# Patient Record
Sex: FEMALE | Race: WHITE | Employment: OTHER | URBAN - METROPOLITAN AREA
[De-identification: names, ages, dates, MRNs, and addresses within clinical notes are randomized per-mention and may not be internally consistent; named-entity substitution may affect disease eponyms.]

---

## 2021-10-27 ENCOUNTER — TRANSCRIBE ORDER (OUTPATIENT)
Dept: SCHEDULING | Age: 76
End: 2021-10-27

## 2021-10-27 DIAGNOSIS — Z96.652 PRESENCE OF LEFT ARTIFICIAL KNEE JOINT: Primary | ICD-10-CM

## 2021-11-02 ENCOUNTER — HOSPITAL ENCOUNTER (OUTPATIENT)
Dept: CT IMAGING | Age: 76
Discharge: HOME | End: 2021-11-02
Attending: ORTHOPAEDIC SURGERY
Payer: MEDICARE

## 2021-11-02 DIAGNOSIS — Z96.652 PRESENCE OF LEFT ARTIFICIAL KNEE JOINT: ICD-10-CM

## 2021-11-02 PROCEDURE — 73700 CT LOWER EXTREMITY W/O DYE: CPT

## 2022-12-14 NOTE — PROGRESS NOTES
Sarah Davalos is a 65 y.o. female presents by PCP for a referral for HRT creme    Chief Complaint   Patient presents with    Referral / Consult     For HRT-sent by PCP     No LMP recorded (lmp unknown). Patient has had a hysterectomy. Birth Control: post menopausal status. Last Pap: pt a poor historian-cannot remember last pap,mammo,dexa or colonoscopyDorothy Amy Huang is a 65 y.o. female presents for a problem visit. Chief Complaint   Patient presents with    Referral / Consult     For HRT-sent by PCP     No LMP recorded (lmp unknown). Patient has had a hysterectomy.

## 2022-12-22 ENCOUNTER — OFFICE VISIT (OUTPATIENT)
Dept: OBGYN CLINIC | Age: 77
End: 2022-12-22
Payer: MEDICARE

## 2022-12-22 ENCOUNTER — TELEPHONE (OUTPATIENT)
Dept: OBGYN CLINIC | Age: 77
End: 2022-12-22

## 2022-12-22 VITALS — DIASTOLIC BLOOD PRESSURE: 85 MMHG | SYSTOLIC BLOOD PRESSURE: 167 MMHG | WEIGHT: 183.4 LBS

## 2022-12-22 DIAGNOSIS — N95.2 VAGINAL ATROPHY: Primary | ICD-10-CM

## 2022-12-22 PROCEDURE — G8536 NO DOC ELDER MAL SCRN: HCPCS | Performed by: OBSTETRICS & GYNECOLOGY

## 2022-12-22 PROCEDURE — 1090F PRES/ABSN URINE INCON ASSESS: CPT | Performed by: OBSTETRICS & GYNECOLOGY

## 2022-12-22 PROCEDURE — G8432 DEP SCR NOT DOC, RNG: HCPCS | Performed by: OBSTETRICS & GYNECOLOGY

## 2022-12-22 PROCEDURE — 1123F ACP DISCUSS/DSCN MKR DOCD: CPT | Performed by: OBSTETRICS & GYNECOLOGY

## 2022-12-22 PROCEDURE — G8400 PT W/DXA NO RESULTS DOC: HCPCS | Performed by: OBSTETRICS & GYNECOLOGY

## 2022-12-22 PROCEDURE — G8421 BMI NOT CALCULATED: HCPCS | Performed by: OBSTETRICS & GYNECOLOGY

## 2022-12-22 PROCEDURE — 1101F PT FALLS ASSESS-DOCD LE1/YR: CPT | Performed by: OBSTETRICS & GYNECOLOGY

## 2022-12-22 PROCEDURE — 99203 OFFICE O/P NEW LOW 30 MIN: CPT | Performed by: OBSTETRICS & GYNECOLOGY

## 2022-12-22 PROCEDURE — G8427 DOCREV CUR MEDS BY ELIG CLIN: HCPCS | Performed by: OBSTETRICS & GYNECOLOGY

## 2022-12-22 RX ORDER — PRIMIDONE 50 MG/1
TABLET ORAL 3 TIMES DAILY
COMMUNITY

## 2022-12-22 RX ORDER — OMEPRAZOLE 40 MG/1
40 CAPSULE, DELAYED RELEASE ORAL DAILY
COMMUNITY

## 2022-12-22 RX ORDER — NICOTINE POLACRILEX 2 MG
GUM BUCCAL
COMMUNITY

## 2022-12-22 RX ORDER — LANOLIN ALCOHOL/MO/W.PET/CERES
325 CREAM (GRAM) TOPICAL
COMMUNITY

## 2022-12-22 RX ORDER — HYDROXYCHLOROQUINE SULFATE 200 MG/1
200 TABLET, FILM COATED ORAL DAILY
COMMUNITY

## 2022-12-22 RX ORDER — FLUOXETINE 10 MG/1
10 CAPSULE ORAL DAILY
COMMUNITY

## 2022-12-22 RX ORDER — ESTRADIOL 1 MG/1
1 TABLET ORAL DAILY
COMMUNITY

## 2022-12-22 RX ORDER — METHENAMINE HIPPURATE 1000 MG/1
1 TABLET ORAL 2 TIMES DAILY WITH MEALS
COMMUNITY

## 2022-12-22 RX ORDER — CHOLECALCIFEROL (VITAMIN D3) 125 MCG
CAPSULE ORAL
COMMUNITY

## 2022-12-22 RX ORDER — ZINC GLUCONATE 50 MG
50 TABLET ORAL DAILY
COMMUNITY

## 2022-12-22 NOTE — PROGRESS NOTES
OB/GYN Problem VIsit    HPI  Amber Jamil is a No obstetric history on file. ,  65 y.o. female who presents for a problem visit. Referred by PCP for evaluation for hormone replacement therapy. The patient has a history of breast cancer status postmastectomy bilateral in the 80's. Using po estradiol and vaginal estrogen.  applies small amount with finger - not even applicator full. No hx of DVT. Was getting this [place]. Has mesh in vagina    History reviewed. No pertinent past medical history. History reviewed. No pertinent surgical history. Social History     Occupational History    Not on file   Tobacco Use    Smoking status: Former     Types: Cigarettes    Smokeless tobacco: Never   Substance and Sexual Activity    Alcohol use: Not Currently    Drug use: Never    Sexual activity: Not on file     History reviewed. No pertinent family history. Allergies   Allergen Reactions    Latex Rash     Prior to Admission medications    Medication Sig Start Date End Date Taking? Authorizing Provider   ascorbic acid, vitamin C, (VITAMIN C) 100 mg tab Take 100 mg by mouth daily. Yes Provider, Historical   biotin 1 mg cap Take  by mouth. Yes Provider, Historical   ferrous sulfate 325 mg (65 mg iron) tablet Take 325 mg by mouth. Yes Provider, Historical   methenamine hippurate (HIPREX) 1 gram tablet Take 1 g by mouth two (2) times daily (with meals). Yes Provider, Historical   zinc gluconate 50 mg tablet Take 50 mg by mouth daily. Yes Provider, Historical   estradioL (ESTRACE) 1 mg tablet Take 1 mg by mouth daily. Yes Provider, Historical   FLUoxetine (PROzac) 10 mg capsule Take 10 mg by mouth daily. Yes Provider, Historical   omeprazole (PRILOSEC) 40 mg capsule Take 40 mg by mouth daily. Yes Provider, Historical   primidone (MYSOLINE) 50 mg tablet Take  by mouth three (3) times daily. Yes Provider, Historical   hydrOXYchloroQUINE (PLAQUENIL) 200 mg tablet Take 200 mg by mouth daily.    Yes Provider, Historical   cholecalciferol, vitamin D3, (Vitamin D3) 50 mcg (2,000 unit) tab Take  by mouth. Yes Provider, Historical   POTASSIUM-99 PO Take  by mouth.    Yes Provider, Historical        Review of Systems: History obtained from the patient  Constitutional: negative for weight loss, fever, night sweats  Breast: negative for breast lumps, nipple discharge, galactorrhea  GI: negative for change in bowel habits, abdominal pain, black or bloody stools  : negative for frequency, dysuria, hematuria, vaginal discharge  MSK: negative for back pain, joint pain, muscle pain  Skin: negative for itching, rash, hives  Psych: negative for anxiety, depression, change in mood      Objective:  Visit Vitals  BP (!) 167/85 (BP 1 Location: Right upper arm)   Wt 183 lb 6.4 oz (83.2 kg)   LMP  (LMP Unknown)       Physical Exam:   PHYSICAL EXAMINATION    Constitutional  Appearance: well-nourished, well developed, alert, in no acute distress      Gastrointestinal  Abdominal Examination: abdomen non-tender to palpation, normal bowel sounds, no masses present  Liver and spleen: no hepatomegaly present, spleen not palpable  Hernias: no hernias identified    Genitourinary  External Genitalia: normal appearance for age, no discharge present, no tenderness present, no inflammatory lesions present, no masses present, no atrophy present  Vagina: normal vaginal vault without central or paravaginal defects, no discharge present, no inflammatory lesions present, no masses present  Bladder: non-tender to palpation  Urethra: appears normal  Cervix: normal   Uterus: normal size, shape and consistency  Adnexa: no adnexal tenderness present, no adnexal masses present  Perineum: perineum within normal limits, no evidence of trauma, no rashes or skin lesions present  Anus: anus within normal limits, no hemorrhoids present  Inguinal Lymph Nodes: no lymphadenopathy present    Skin  General Inspection: no rash, no lesions identified    Neurologic/Psychiatric  Mental Status:  Orientation: grossly oriented to person, place and time  Mood and Affect: mood normal, affect appropriate      ASSESSMENT:    ICD-10-CM ICD-9-CM    1. Encounter for gynecological examination (general) (routine) without abnormal findings  Z01.419 V72.31           PLAN:  Orders Placed This Encounter    ascorbic acid, vitamin C, (VITAMIN C) 100 mg tab     Sig: Take 100 mg by mouth daily. biotin 1 mg cap     Sig: Take  by mouth. ferrous sulfate 325 mg (65 mg iron) tablet     Sig: Take 325 mg by mouth.    methenamine hippurate (HIPREX) 1 gram tablet     Sig: Take 1 g by mouth two (2) times daily (with meals). zinc gluconate 50 mg tablet     Sig: Take 50 mg by mouth daily. estradioL (ESTRACE) 1 mg tablet     Sig: Take 1 mg by mouth daily. FLUoxetine (PROzac) 10 mg capsule     Sig: Take 10 mg by mouth daily. omeprazole (PRILOSEC) 40 mg capsule     Sig: Take 40 mg by mouth daily. primidone (MYSOLINE) 50 mg tablet     Sig: Take  by mouth three (3) times daily. hydrOXYchloroQUINE (PLAQUENIL) 200 mg tablet     Sig: Take 200 mg by mouth daily. cholecalciferol, vitamin D3, (Vitamin D3) 50 mcg (2,000 unit) tab     Sig: Take  by mouth. POTASSIUM-99 PO     Sig: Take  by mouth. RTO prn if symptoms persist or worsen. Instructions given to pt. Handouts given to pt.

## 2022-12-22 NOTE — TELEPHONE ENCOUNTER
Attempt to call patient with number provide x 3. Immediately goes to voicemail. Pt did not update pharmacy at  upon arrival as new patient.

## 2022-12-28 ENCOUNTER — TELEPHONE (OUTPATIENT)
Dept: OBGYN CLINIC | Age: 77
End: 2022-12-28

## 2022-12-28 RX ORDER — ESTRADIOL 0.1 MG/G
CREAM VAGINAL
Qty: 42.5 G | Refills: 1 | Status: SHIPPED | OUTPATIENT
Start: 2022-12-28 | End: 2022-12-28

## 2022-12-28 RX ORDER — ESTRADIOL 0.1 MG/G
CREAM VAGINAL
Qty: 42.5 G | Refills: 1 | Status: SHIPPED | OUTPATIENT
Start: 2022-12-28

## 2022-12-28 NOTE — TELEPHONE ENCOUNTER
65year old patient seen in the office on 12/22/2022      advised of need for patient to sign HIPPA form    Prescription refill sent as per MD order to patient preferred pharmacy    Patient verbalized understanding.

## 2023-01-10 RX ORDER — ESTRADIOL 0.1 MG/G
CREAM VAGINAL
Qty: 42.5 G | Refills: 1 | Status: SHIPPED | OUTPATIENT
Start: 2023-01-10

## 2024-01-30 ASSESSMENT — ENCOUNTER SYMPTOMS
CONSTIPATION: 0
CHEST TIGHTNESS: 0
SHORTNESS OF BREATH: 0
SINUS PAIN: 0
COUGH: 0
ABDOMINAL PAIN: 0
NAUSEA: 0
BLOOD IN STOOL: 0
DIARRHEA: 0

## 2024-01-31 ENCOUNTER — OFFICE VISIT (OUTPATIENT)
Facility: CLINIC | Age: 79
End: 2024-01-31
Payer: MEDICARE

## 2024-01-31 VITALS
RESPIRATION RATE: 16 BRPM | OXYGEN SATURATION: 98 % | BODY MASS INDEX: 26.77 KG/M2 | DIASTOLIC BLOOD PRESSURE: 68 MMHG | TEMPERATURE: 98.2 F | HEIGHT: 70 IN | WEIGHT: 187 LBS | SYSTOLIC BLOOD PRESSURE: 126 MMHG | HEART RATE: 74 BPM

## 2024-01-31 DIAGNOSIS — M19.90 ARTHRITIS: ICD-10-CM

## 2024-01-31 DIAGNOSIS — K58.0 IRRITABLE BOWEL SYNDROME WITH DIARRHEA: ICD-10-CM

## 2024-01-31 DIAGNOSIS — R21 ACUTE ERUPTION OF SKIN: ICD-10-CM

## 2024-01-31 DIAGNOSIS — M32.9 LUPUS (HCC): Primary | ICD-10-CM

## 2024-01-31 DIAGNOSIS — Z85.3 HISTORY OF BREAST CANCER: ICD-10-CM

## 2024-01-31 DIAGNOSIS — F33.41 RECURRENT MAJOR DEPRESSIVE DISORDER, IN PARTIAL REMISSION (HCC): ICD-10-CM

## 2024-01-31 PROCEDURE — G8427 DOCREV CUR MEDS BY ELIG CLIN: HCPCS | Performed by: FAMILY MEDICINE

## 2024-01-31 PROCEDURE — G8400 PT W/DXA NO RESULTS DOC: HCPCS | Performed by: FAMILY MEDICINE

## 2024-01-31 PROCEDURE — 1090F PRES/ABSN URINE INCON ASSESS: CPT | Performed by: FAMILY MEDICINE

## 2024-01-31 PROCEDURE — G8419 CALC BMI OUT NRM PARAM NOF/U: HCPCS | Performed by: FAMILY MEDICINE

## 2024-01-31 PROCEDURE — 1036F TOBACCO NON-USER: CPT | Performed by: FAMILY MEDICINE

## 2024-01-31 PROCEDURE — 99214 OFFICE O/P EST MOD 30 MIN: CPT | Performed by: FAMILY MEDICINE

## 2024-01-31 PROCEDURE — G8484 FLU IMMUNIZE NO ADMIN: HCPCS | Performed by: FAMILY MEDICINE

## 2024-01-31 PROCEDURE — 1123F ACP DISCUSS/DSCN MKR DOCD: CPT | Performed by: FAMILY MEDICINE

## 2024-01-31 RX ORDER — TRIAMCINOLONE ACETONIDE 1 MG/G
CREAM TOPICAL
Qty: 80 G | Refills: 1 | Status: SHIPPED | OUTPATIENT
Start: 2024-01-31 | End: 2024-01-31

## 2024-01-31 RX ORDER — TRIAMCINOLONE ACETONIDE 1 MG/G
CREAM TOPICAL
Qty: 80 G | Refills: 1 | Status: SHIPPED | OUTPATIENT
Start: 2024-01-31

## 2024-01-31 SDOH — ECONOMIC STABILITY: FOOD INSECURITY: WITHIN THE PAST 12 MONTHS, YOU WORRIED THAT YOUR FOOD WOULD RUN OUT BEFORE YOU GOT MONEY TO BUY MORE.: NEVER TRUE

## 2024-01-31 SDOH — ECONOMIC STABILITY: HOUSING INSECURITY
IN THE LAST 12 MONTHS, WAS THERE A TIME WHEN YOU DID NOT HAVE A STEADY PLACE TO SLEEP OR SLEPT IN A SHELTER (INCLUDING NOW)?: NO

## 2024-01-31 SDOH — ECONOMIC STABILITY: INCOME INSECURITY: HOW HARD IS IT FOR YOU TO PAY FOR THE VERY BASICS LIKE FOOD, HOUSING, MEDICAL CARE, AND HEATING?: NOT HARD AT ALL

## 2024-01-31 SDOH — ECONOMIC STABILITY: FOOD INSECURITY: WITHIN THE PAST 12 MONTHS, THE FOOD YOU BOUGHT JUST DIDN'T LAST AND YOU DIDN'T HAVE MONEY TO GET MORE.: NEVER TRUE

## 2024-01-31 ASSESSMENT — PATIENT HEALTH QUESTIONNAIRE - PHQ9
SUM OF ALL RESPONSES TO PHQ9 QUESTIONS 1 & 2: 0
SUM OF ALL RESPONSES TO PHQ QUESTIONS 1-9: 0
1. LITTLE INTEREST OR PLEASURE IN DOING THINGS: 0
2. FEELING DOWN, DEPRESSED OR HOPELESS: 0
SUM OF ALL RESPONSES TO PHQ QUESTIONS 1-9: 0

## 2024-01-31 ASSESSMENT — ENCOUNTER SYMPTOMS: ROS SKIN COMMENTS: ITCHING

## 2024-01-31 NOTE — PROGRESS NOTES
Assessment/Plan:     Diagnoses and all orders for this visit:      1. Lupus (HCC)  -    Will provide refill of triamcinolone cream. Advised sparing use of this and to try to avoid face. Will request notes from Rheum as they report recent labs performed.   - triamcinolone (KENALOG) 0.1 % cream; Apply topically 2 times daily - use small amount only as needed; use rarely on face, Disp-80 g, R-1, Normal  2. Acute eruption of skin  -   Discussed signs and symptoms of infection including swelling, warmth. Concern given pruritus that patient could develop infection. They agree to reach out should symptoms worsen.  -  triamcinolone (KENALOG) 0.1 % cream; Apply topically 2 times daily - use small amount only as needed; use rarely on face, Disp-80 g, R-1, Normal  3. Recurrent major depressive disorder, in partial remission (HCC)  - Stable with use of prozac, denies SI/HI  4. Irritable bowel syndrome with diarrhea  - Per patient, following with GI, no GI records for review.   5. Arthritis  - Being followed by Dr. Leggett. Patient's  requesting permanent disability parking placard for patient as well as himself. Discussed that I could not complete his form as I have not seen him as a patient. I also do not have Ortho records for Ms. Lilly. Advised that I do not complete permanent disability placards except in the event of conditions such as muscular dystrophy, palsy, wheelchair-limiting conditions. I offered a six month temporary placard, however pt's  stated this would not work for them given their travel. He took back forms for both himself and patient. He plans to discuss with Dr. Cruz during his upcoming new patient appt.   6. History of breast cancer  - No new symptoms or treatment.      Faxed request to Dr. Crowder for recent blood work and note.     Return in about 6 months (around 7/31/2024) for AWV.       Discussed expected course/resolution/complications of diagnosis in detail with patient.

## 2024-01-31 NOTE — PROGRESS NOTES
Patient's identity has been verified with 2 identifiers (Name and )    Chief Complaint   Patient presents with    New Patient     Annie Lilly is an 79 y.o. female who presents as a new patient to [facility] to establish care.         Health Maintenance Due   Topic    COVID-19 Vaccine (1)    Depression Screen     DTaP/Tdap/Td vaccine (1 - Tdap)    Shingles vaccine (1 of 2)    DEXA (modify frequency per FRAX score)     Respiratory Syncytial Virus (RSV) Pregnant or age 60 yrs+ (1 - 1-dose 60+ series)    Pneumococcal 65+ years Vaccine (1 - PCV)    Flu vaccine (1)    Annual Wellness Visit (Medicare)        Wt Readings from Last 3 Encounters:   24 84.8 kg (187 lb)   22 83.2 kg (183 lb 6.4 oz)     Temp Readings from Last 3 Encounters:   No data found for Temp     BP Readings from Last 3 Encounters:   22 (!) 167/85     Pulse Readings from Last 3 Encounters:   No data found for Pulse           Coordination of Care Questionnaire:  :   1. \"Have you been to the ER, urgent care clinic since your last visit?  Hospitalized since your last visit?\" no    2. \"Have you seen or consulted any other health care providers outside of the [facility] since your last visit?\" no     3. For patients aged 45-75: Has the patient had a colonoscopy / FIT/ Cologuard? N/a    If the patient is female:    1. For patients aged 40-74: Has the patient had a mammogram within the past 2 years? N/a    2. For patients aged 21-65: Has the patient had a pap smear? N/a     Do you have an Advance Directive on file? no  Are you interested in receiving information about Advance Directives? no    Patient is accompanied by . I have received verbal consent from Annie Lilly to discuss any/all medical information while they are present in the room.

## 2024-06-14 ENCOUNTER — TELEPHONE (OUTPATIENT)
Facility: CLINIC | Age: 79
End: 2024-06-14

## 2024-06-14 NOTE — TELEPHONE ENCOUNTER
Pts  called requesting refills be sent to   [facility]   [address]    Cholestyramine 4 gm packets   Uses 2 packs a day    90 days supply

## 2024-06-17 DIAGNOSIS — K58.0 IRRITABLE BOWEL SYNDROME WITH DIARRHEA: Primary | ICD-10-CM

## 2024-06-17 RX ORDER — CHOLESTYRAMINE 4 G/9G
4 POWDER, FOR SUSPENSION ORAL 2 TIMES DAILY WITH MEALS
Qty: 60 EACH | Refills: 5 | Status: SHIPPED | OUTPATIENT
Start: 2024-06-17 | End: 2024-12-14

## 2024-11-07 ENCOUNTER — TELEPHONE (OUTPATIENT)
Facility: CLINIC | Age: 79
End: 2024-11-07

## 2025-01-23 ENCOUNTER — TELEPHONE (OUTPATIENT)
Facility: CLINIC | Age: 80
End: 2025-01-23

## 2025-02-11 ENCOUNTER — TELEPHONE (OUTPATIENT)
Facility: CLINIC | Age: 80
End: 2025-02-11

## 2025-02-11 DIAGNOSIS — M32.8 OTHER FORMS OF SYSTEMIC LUPUS ERYTHEMATOSUS, UNSPECIFIED ORGAN INVOLVEMENT STATUS (HCC): Primary | ICD-10-CM

## 2025-02-11 NOTE — TELEPHONE ENCOUNTER
Pt's  will like     [facility]heumat[facility]   Office # [phone]  Fax # [phone]      Pt will need provider referral  Office Notes  Labs

## 2025-02-11 NOTE — TELEPHONE ENCOUNTER
Pt's  called asking if records form pt's rheumatology has been received    I do not recall.     Pt's  states Dr. Kruse, is current Rheumatologist  and wants pt referred to a new rheumatology.  will advise of whom.